# Patient Record
Sex: MALE | Race: WHITE | NOT HISPANIC OR LATINO | URBAN - METROPOLITAN AREA
[De-identification: names, ages, dates, MRNs, and addresses within clinical notes are randomized per-mention and may not be internally consistent; named-entity substitution may affect disease eponyms.]

---

## 2017-05-04 ENCOUNTER — EMERGENCY (EMERGENCY)
Facility: HOSPITAL | Age: 27
LOS: 1 days | Discharge: HOME | End: 2017-05-04
Attending: EMERGENCY MEDICINE | Admitting: EMERGENCY MEDICINE
Payer: COMMERCIAL

## 2017-05-04 VITALS
TEMPERATURE: 99 F | DIASTOLIC BLOOD PRESSURE: 75 MMHG | OXYGEN SATURATION: 98 % | SYSTOLIC BLOOD PRESSURE: 142 MMHG | HEART RATE: 86 BPM | RESPIRATION RATE: 18 BRPM

## 2017-05-04 PROCEDURE — 99283 EMERGENCY DEPT VISIT LOW MDM: CPT

## 2017-05-04 RX ORDER — FLUTICASONE PROPIONATE 50 MCG
1 SPRAY, SUSPENSION NASAL
Qty: 1 | Refills: 0 | OUTPATIENT
Start: 2017-05-04 | End: 2017-06-03

## 2017-05-04 RX ORDER — IBUPROFEN 200 MG
1 TABLET ORAL
Qty: 15 | Refills: 0 | OUTPATIENT
Start: 2017-05-04 | End: 2017-05-09

## 2017-05-04 RX ORDER — IBUPROFEN 200 MG
600 TABLET ORAL ONCE
Qty: 0 | Refills: 0 | Status: COMPLETED | OUTPATIENT
Start: 2017-05-04 | End: 2017-05-04

## 2017-05-04 RX ADMIN — Medication 600 MILLIGRAM(S): at 20:30

## 2017-05-04 NOTE — ED PROVIDER NOTE - MEDICAL DECISION MAKING DETAILS
26 y/o M with PMH of asthma p/w headache and sinus congestion for 2-3 days. sinus congestion w/ headache , nml neuro. ibuprofen f/u PCP 26 y/o M with PMH of asthma p/w headache and sinus congestion for 2-3 days. sinus congestion w/ headache , nml neuro. no nuchal rigidity.  no dc or fevers.  ibuprofen f/u PCP

## 2017-05-04 NOTE — ED PROVIDER NOTE - ATTENDING CONTRIBUTION TO CARE
Attending Attestation: Dr. Flores  I have personally performed a history and physical examination of the patient and discussed management with the resident as well as the patient.  I reviewed the resident's note and agree with the documented findings and plan of care.  I have authored and modified critical sections of the Provider Note, including but not limited to HPI, Physical Exam and MDM. 26 y/o M with PMH of asthma p/w headache and sinus congestion for 2-3 days. sinus congestion w/ headache , nml neuro. no nuchal rigidity.  no dc or fevers.  ibuprofen f/u PCP

## 2017-05-04 NOTE — ED PROVIDER NOTE - OBJECTIVE STATEMENT
28 y/o M with PMH of asthma p/w headache and sinus congestion for 2-3 days. Pt accompanied by family states that for the last 2-3 days he has had increased pressure in the front of his head w/ a headache which radiates to the back of the head. Pain is 7/10 intermittent. He denies fever, nausea, vomiting, weakness, diarrhea, dysuria, syncope, trauma 26 y/o M with PMH of asthma p/w headache and sinus congestion for 2-3 days. Pt accompanied by family states that for the last 2-3 days he has had increased pressure in the front of his head w/ a headache which radiates to the back of the head. Pain is 7/10 intermittent. He denies fever, nausea, vomiting, weakness, diarrhea, dysuria, syncope, trauma. Otherwise well. Normal PO intake.  no vision changes.

## 2017-05-04 NOTE — ED PROVIDER NOTE - CARE PLAN
Principal Discharge DX:	Sinus headache  Instructions for follow-up, activity and diet:	During your ED visit you were evaluated for headache. Take ibuprofen 600mg every 8 hours as needed for headache or excedrin migraine. Use flonase 1 spray per nostril once daily. Follow up with your PCP within 1 week. Return to the ED if you exhibit any new, continued or worsening symptoms.

## 2017-05-04 NOTE — ED PROVIDER NOTE - MUSCULOSKELETAL, MLM
Spine appears normal, range of motion is not limited, no muscle or joint tenderness Spine appears normal, range of motion is not limited, no muscle or joint tenderness. No neck pain or rigidity.

## 2017-05-04 NOTE — ED PROVIDER NOTE - PLAN OF CARE
During your ED visit you were evaluated for headache. Take ibuprofen 600mg every 8 hours as needed for headache or excedrin migraine. Use flonase 1 spray per nostril once daily. Follow up with your PCP within 1 week. Return to the ED if you exhibit any new, continued or worsening symptoms.

## 2017-05-04 NOTE — ED ADULT TRIAGE NOTE - CHIEF COMPLAINT QUOTE
c/o headache, dizziness, sinus pain x few days.  Pt endorses using Afrin and saline with some relief.

## 2018-02-17 ENCOUNTER — EMERGENCY (EMERGENCY)
Facility: HOSPITAL | Age: 28
LOS: 0 days | Discharge: HOME | End: 2018-02-17
Attending: EMERGENCY MEDICINE | Admitting: EMERGENCY MEDICINE
Payer: COMMERCIAL

## 2018-02-17 VITALS
SYSTOLIC BLOOD PRESSURE: 139 MMHG | OXYGEN SATURATION: 100 % | TEMPERATURE: 98 F | DIASTOLIC BLOOD PRESSURE: 82 MMHG | RESPIRATION RATE: 18 BRPM | HEART RATE: 58 BPM

## 2018-02-17 VITALS — WEIGHT: 160.06 LBS

## 2018-02-17 DIAGNOSIS — J34.2 DEVIATED NASAL SEPTUM: Chronic | ICD-10-CM

## 2018-02-17 DIAGNOSIS — R20.2 PARESTHESIA OF SKIN: ICD-10-CM

## 2018-02-17 DIAGNOSIS — Z88.0 ALLERGY STATUS TO PENICILLIN: ICD-10-CM

## 2018-02-17 DIAGNOSIS — J45.909 UNSPECIFIED ASTHMA, UNCOMPLICATED: ICD-10-CM

## 2018-02-17 PROCEDURE — 99285 EMERGENCY DEPT VISIT HI MDM: CPT

## 2018-02-17 NOTE — ED STATDOCS - PROGRESS NOTE DETAILS
signed Kelsey Cárdenas PA-C Pt seen and evaluated initially in intake by Dr Brannon.   Pt with tingling of left upper arm ending at elbow. Recommend outpt f/u PMD. Pt feeling well, agrees with DC and plan of care.

## 2018-02-17 NOTE — ED STATDOCS - OBJECTIVE STATEMENT
26 y/o male with a PMHx of asthma presents to the ED c/o left arm tingling beginning today. Pt felt normal yesterday and states that he might have woke up with tingling and pain. Pain starts in the back of the left shoulder and radiates to the left elbow. Never had an episode like this in the past. No weakness, neck pain. Pt had surgery 2 weeks ago for a deviated septum, and was on Tylenol, Ambien, Oxycodone. Last night, pt only took Tylenol PM. No alcohol use last night. Allergic to Amoxicillin. Family history of HTN and DM.

## 2018-02-17 NOTE — ED STATDOCS - ATTENDING CONTRIBUTION TO CARE
I, Jim Brannon MD,  performed the initial face to face bedside interview with this patient regarding history of present illness, review of symptoms and relevant past medical, social and family history.  I completed an independent physical examination.  I was the initial provider who evaluated this patient. I have signed out the follow up of any pending tests (i.e. labs, radiological studies) to the ACP.  I have communicated the patient’s plan of care and disposition with the ACP.  The history, relevant review of systems, past medical and surgical history, medical decision making, and physical examination was documented by the scribe in my presence and I attest to the accuracy of the documentation.  I, Jim Brannon MD, personally saw the patient with ACP.  I have personally performed a face to face diagnostic evaluation on this patient.  I have reviewed the ACP note and agree with the history, exam, and plan of care, except as noted.

## 2018-02-17 NOTE — ED STATDOCS - MEDICAL DECISION MAKING DETAILS
Shared decision making with pt and he will watch symptoms closely over day, if any worsening of symptoms, will return for further workup.

## 2018-03-21 ENCOUNTER — EMERGENCY (EMERGENCY)
Facility: HOSPITAL | Age: 28
LOS: 1 days | Discharge: HOME | End: 2018-03-21
Attending: EMERGENCY MEDICINE | Admitting: EMERGENCY MEDICINE
Payer: COMMERCIAL

## 2018-03-21 VITALS
TEMPERATURE: 98 F | HEART RATE: 68 BPM | OXYGEN SATURATION: 98 % | DIASTOLIC BLOOD PRESSURE: 76 MMHG | RESPIRATION RATE: 16 BRPM | SYSTOLIC BLOOD PRESSURE: 129 MMHG

## 2018-03-21 VITALS
DIASTOLIC BLOOD PRESSURE: 81 MMHG | OXYGEN SATURATION: 96 % | HEART RATE: 72 BPM | SYSTOLIC BLOOD PRESSURE: 130 MMHG | RESPIRATION RATE: 14 BRPM

## 2018-03-21 DIAGNOSIS — J34.2 DEVIATED NASAL SEPTUM: Chronic | ICD-10-CM

## 2018-03-21 LAB
ALBUMIN SERPL ELPH-MCNC: 4.8 G/DL — SIGNIFICANT CHANGE UP (ref 3.3–5)
ALP SERPL-CCNC: 51 U/L — SIGNIFICANT CHANGE UP (ref 40–120)
ALT FLD-CCNC: 69 U/L — HIGH (ref 4–41)
AST SERPL-CCNC: 39 U/L — SIGNIFICANT CHANGE UP (ref 4–40)
BASOPHILS # BLD AUTO: 0.01 K/UL — SIGNIFICANT CHANGE UP (ref 0–0.2)
BASOPHILS NFR BLD AUTO: 0.2 % — SIGNIFICANT CHANGE UP (ref 0–2)
BILIRUB SERPL-MCNC: 0.6 MG/DL — SIGNIFICANT CHANGE UP (ref 0.2–1.2)
BUN SERPL-MCNC: 15 MG/DL — SIGNIFICANT CHANGE UP (ref 7–23)
CALCIUM SERPL-MCNC: 9 MG/DL — SIGNIFICANT CHANGE UP (ref 8.4–10.5)
CHLORIDE SERPL-SCNC: 97 MMOL/L — LOW (ref 98–107)
CK MB BLD-MCNC: 0.6 — SIGNIFICANT CHANGE UP (ref 0–2.5)
CK MB BLD-MCNC: 0.6 — SIGNIFICANT CHANGE UP (ref 0–2.5)
CK MB BLD-MCNC: 1.31 NG/ML — SIGNIFICANT CHANGE UP (ref 1–6.6)
CK MB BLD-MCNC: 1.59 NG/ML — SIGNIFICANT CHANGE UP (ref 1–6.6)
CK SERPL-CCNC: 215 U/L — HIGH (ref 30–200)
CK SERPL-CCNC: 249 U/L — HIGH (ref 30–200)
CO2 SERPL-SCNC: 25 MMOL/L — SIGNIFICANT CHANGE UP (ref 22–31)
CREAT SERPL-MCNC: 1.34 MG/DL — HIGH (ref 0.5–1.3)
EOSINOPHIL # BLD AUTO: 0.03 K/UL — SIGNIFICANT CHANGE UP (ref 0–0.5)
EOSINOPHIL NFR BLD AUTO: 0.5 % — SIGNIFICANT CHANGE UP (ref 0–6)
GLUCOSE SERPL-MCNC: 91 MG/DL — SIGNIFICANT CHANGE UP (ref 70–99)
HCT VFR BLD CALC: 47.1 % — SIGNIFICANT CHANGE UP (ref 39–50)
HGB BLD-MCNC: 16.1 G/DL — SIGNIFICANT CHANGE UP (ref 13–17)
IMM GRANULOCYTES # BLD AUTO: 0.02 # — SIGNIFICANT CHANGE UP
IMM GRANULOCYTES NFR BLD AUTO: 0.3 % — SIGNIFICANT CHANGE UP (ref 0–1.5)
LYMPHOCYTES # BLD AUTO: 1.92 K/UL — SIGNIFICANT CHANGE UP (ref 1–3.3)
LYMPHOCYTES # BLD AUTO: 29.2 % — SIGNIFICANT CHANGE UP (ref 13–44)
MCHC RBC-ENTMCNC: 29 PG — SIGNIFICANT CHANGE UP (ref 27–34)
MCHC RBC-ENTMCNC: 34.2 % — SIGNIFICANT CHANGE UP (ref 32–36)
MCV RBC AUTO: 84.7 FL — SIGNIFICANT CHANGE UP (ref 80–100)
MONOCYTES # BLD AUTO: 0.45 K/UL — SIGNIFICANT CHANGE UP (ref 0–0.9)
MONOCYTES NFR BLD AUTO: 6.8 % — SIGNIFICANT CHANGE UP (ref 2–14)
NEUTROPHILS # BLD AUTO: 4.14 K/UL — SIGNIFICANT CHANGE UP (ref 1.8–7.4)
NEUTROPHILS NFR BLD AUTO: 63 % — SIGNIFICANT CHANGE UP (ref 43–77)
NRBC # FLD: 0 — SIGNIFICANT CHANGE UP
PLATELET # BLD AUTO: 243 K/UL — SIGNIFICANT CHANGE UP (ref 150–400)
PMV BLD: 10.2 FL — SIGNIFICANT CHANGE UP (ref 7–13)
POTASSIUM SERPL-MCNC: 4 MMOL/L — SIGNIFICANT CHANGE UP (ref 3.5–5.3)
POTASSIUM SERPL-SCNC: 4 MMOL/L — SIGNIFICANT CHANGE UP (ref 3.5–5.3)
PROT SERPL-MCNC: 7.8 G/DL — SIGNIFICANT CHANGE UP (ref 6–8.3)
RBC # BLD: 5.56 M/UL — SIGNIFICANT CHANGE UP (ref 4.2–5.8)
RBC # FLD: 11.8 % — SIGNIFICANT CHANGE UP (ref 10.3–14.5)
SODIUM SERPL-SCNC: 137 MMOL/L — SIGNIFICANT CHANGE UP (ref 135–145)
TROPONIN T SERPL-MCNC: < 0.06 NG/ML — SIGNIFICANT CHANGE UP (ref 0–0.06)
TROPONIN T SERPL-MCNC: < 0.06 NG/ML — SIGNIFICANT CHANGE UP (ref 0–0.06)
WBC # BLD: 6.57 K/UL — SIGNIFICANT CHANGE UP (ref 3.8–10.5)
WBC # FLD AUTO: 6.57 K/UL — SIGNIFICANT CHANGE UP (ref 3.8–10.5)

## 2018-03-21 PROCEDURE — 71046 X-RAY EXAM CHEST 2 VIEWS: CPT | Mod: 26

## 2018-03-21 PROCEDURE — 99285 EMERGENCY DEPT VISIT HI MDM: CPT

## 2018-03-21 NOTE — ED PROVIDER NOTE - ATTENDING CONTRIBUTION TO CARE
Mollyo  pt with c/o chest tightness and palpitations which occurred during an argument around 4:30 this afternoon  Also felt tightness on treadmill yesterday  Pt has asthma and tightness felt similar but today had assoc palpitations which was different for him  Alia denies recent travel  cough fever   No FH of Cad    pt also notes intermittent cold feeling to left bicep x 1 month  nonexertional  no assoc tactile temp shange or color change  exam  pt in no visible distress  clear lungs  card RRR S1S2 no RMG  no CCE  nl pulses all extremities and carotids   nl strength sensation in exts   EAST test negative  EKG  nl

## 2018-03-21 NOTE — ED ADULT TRIAGE NOTE - CHIEF COMPLAINT QUOTE
pt amb to triage c/o chest tightness with left arm pain reports numbness and tingling, pt states he was running on treadmill yesterday and experienced the same thing,  denies sob, fever, chills, pt PMH of asthma, reports taking inhaler this morning with little relief, no respiratory distress noted, ekg in progress

## 2018-03-21 NOTE — ED PROVIDER NOTE - OBJECTIVE STATEMENT
26 y/o male pmh asthma c/o chest pain x2-3 days. Pt admits to substernal chest tightness x3 days, worse after running on treadmill x20 minutes x1 day ago and after argument today. pt admits to feeling mild sob and generalized weakness associated with pain. Pt last several minutes and then resolves. Pt also c/o left arm tingling and "cold sensation" x1 month. Pt admits to being told he has cervical radiculitis in the past. Denies palpitations, diaphoresis, n/v/d, numbness, weakness, dizziness, syncope, fever or chills.

## 2018-03-21 NOTE — ED PROVIDER NOTE - CARE PLAN
Principal Discharge DX:	Chest pain  Assessment and plan of treatment:	FOLLOW UP WITH YOUR PRIMARY MEDICAL DOCTOR IN 2-3 DAYS OR WITHIN THE WEEK. If you have issues obtaining follow up, please call: [phone] to obtain a doctor or specialist who takes your insurance in your area. SHOW COPIES OF YOUR RESULTS/REPORTS GIVEN TO YOU.  Take all of your other medications as previously prescribed. Worsening or continued chest pain, shortness of breath, weakness, return to ED.

## 2020-07-14 ENCOUNTER — EMERGENCY (EMERGENCY)
Facility: HOSPITAL | Age: 30
LOS: 1 days | Discharge: HOME | End: 2020-07-14
Attending: STUDENT IN AN ORGANIZED HEALTH CARE EDUCATION/TRAINING PROGRAM | Admitting: STUDENT IN AN ORGANIZED HEALTH CARE EDUCATION/TRAINING PROGRAM
Payer: COMMERCIAL

## 2020-07-14 VITALS
TEMPERATURE: 98 F | RESPIRATION RATE: 16 BRPM | OXYGEN SATURATION: 100 % | SYSTOLIC BLOOD PRESSURE: 116 MMHG | DIASTOLIC BLOOD PRESSURE: 75 MMHG | HEART RATE: 68 BPM

## 2020-07-14 VITALS
SYSTOLIC BLOOD PRESSURE: 148 MMHG | DIASTOLIC BLOOD PRESSURE: 83 MMHG | RESPIRATION RATE: 13 BRPM | OXYGEN SATURATION: 99 % | HEART RATE: 68 BPM | TEMPERATURE: 98 F

## 2020-07-14 DIAGNOSIS — J34.2 DEVIATED NASAL SEPTUM: Chronic | ICD-10-CM

## 2020-07-14 PROBLEM — J45.909 UNSPECIFIED ASTHMA, UNCOMPLICATED: Chronic | Status: ACTIVE | Noted: 2018-02-19

## 2020-07-14 PROBLEM — J45.909 UNSPECIFIED ASTHMA, UNCOMPLICATED: Chronic | Status: ACTIVE | Noted: 2017-05-04

## 2020-07-14 PROCEDURE — 93010 ELECTROCARDIOGRAM REPORT: CPT

## 2020-07-14 PROCEDURE — 99283 EMERGENCY DEPT VISIT LOW MDM: CPT | Mod: 25

## 2020-07-14 PROCEDURE — 71046 X-RAY EXAM CHEST 2 VIEWS: CPT | Mod: 26

## 2020-07-14 RX ORDER — ACETAMINOPHEN 500 MG
975 TABLET ORAL ONCE
Refills: 0 | Status: COMPLETED | OUTPATIENT
Start: 2020-07-14 | End: 2020-07-14

## 2020-07-14 RX ORDER — IBUPROFEN 200 MG
600 TABLET ORAL ONCE
Refills: 0 | Status: COMPLETED | OUTPATIENT
Start: 2020-07-14 | End: 2020-07-14

## 2020-07-14 RX ORDER — ALBUTEROL 90 UG/1
2 AEROSOL, METERED ORAL ONCE
Refills: 0 | Status: COMPLETED | OUTPATIENT
Start: 2020-07-14 | End: 2020-07-14

## 2020-07-14 RX ADMIN — Medication 40 MILLIGRAM(S): at 05:50

## 2020-07-14 RX ADMIN — ALBUTEROL 2 PUFF(S): 90 AEROSOL, METERED ORAL at 05:51

## 2020-07-14 RX ADMIN — Medication 600 MILLIGRAM(S): at 05:36

## 2020-07-14 RX ADMIN — Medication 975 MILLIGRAM(S): at 05:37

## 2020-07-14 NOTE — ED PROVIDER NOTE - ATTENDING CONTRIBUTION TO CARE
29y/o M with PMH asthma p/w sob and  chest tightness started yesterday.Pt reports that he has 2 days of mid sternalk chest tightness which is worse with lying flat. Pain is substernal w/o radiation. When he takes deep breath symptoms are worse. Pt. reports he was recently around 2 friends that were  found to be covid positive in march . Pt denies fever, chills, n/v, abdominal pain, diarrhea. He took some motrin for the pain which improved.   denies fever, chills, cbdominal pain, diarrhea, dysuria, syncope, bleeding, new rash,weakness, numbness, blurred vision  , cough   ROS  otherwise negative as per HPI  Gen: Awake, Alert, WD, WN, NAD  Head:  NC/AT  Eyes:  PERRL, EOMI, Conjunctiva pink, lids normal, no scleral icterus  ENT: OP clear, no exudates, no erythema, uvula midline, TMs clear bilaterally, moist mucus membranes  Neck: supple, nontender, no meningismus, no JVD, trachea midline  Cardiac/CV:  S1 S2, RRR, no M/G/R  Respiratory/Pulm:  CTAB, mildly decreased BS b/l, no wheezing   Gastrointestinal/Abdomen:  Soft, nontender, nondistended, +BS, no rebound/guarding  Back:  no CVAT, no MLT  Ext:  warm, well perfused, moving all extremities spontaneously, no peripheral edema, distal pulses intact  Skin: intact, no rash  Neuro:  AAOx3, sensation intact, motor 5/5 x 4 extremities, normal gait, speech clear  MDM as above

## 2020-07-14 NOTE — ED PROVIDER NOTE - PATIENT PORTAL LINK FT
You can access the FollowMyHealth Patient Portal offered by [facility] by registering at the following website: [email or web address]. By joining [facility]’s FollowMyHealth portal, you will also be able to view your health information using other applications (apps) compatible with our system.

## 2020-07-14 NOTE — ED ADULT NURSE REASSESSMENT NOTE - NS ED NURSE REASSESS COMMENT FT1
Pt A&Ox4, ambulatory. Breathing even and unlabored. Pt not in any distress. Verbalizes no relief from medications given. States he still has chest tightness. Pt states he tested negative for COVID last month but was  he was exposed to known COVID+ people last Saturday. Will continue to monitor.

## 2020-07-14 NOTE — ED PROVIDER NOTE - CLINICAL SUMMARY MEDICAL DECISION MAKING FREE TEXT BOX
31y/o M with PMH asthma p/w sob and  chest tightness started yesterday.  Afebrile, no cough, pain mild substernal exam w/ mildly dimished BS b/l, will obtain xray , ibuprofen, tylenol, prednisone, albuterol. EKG   D/c with pcp follow up

## 2020-07-14 NOTE — ED PROVIDER NOTE - MUSCULOSKELETAL, MLM
Spine appears normal, range of motion is not limited, no muscle or joint tenderness. no chest wall TTP

## 2020-07-14 NOTE — ED ADULT NURSE NOTE - OBJECTIVE STATEMENT
Pt received in INT04. C/o chest tightness and SOB that started yesterday. Pt denies any trauma to site. States he also had numbness on both arms but it has resolved. Denies taking any medications. Denies alcohol or drug use. Hx of asthma. A&Ox4, ambulatory. Breathing even and unlabored. Normal sinus rhythm on the monitor. Vitals are stable. 2+ radial pulses bilat. No complaints of headache, nausea, dizziness, vomiting  SOB, fever, or chills. Will continue to monitor.

## 2020-07-14 NOTE — ED PROVIDER NOTE - PROGRESS NOTE DETAILS
Dr. Lilly: I have personally seen and examined this patient at the bedside. I have fully participated in the care of this patient. I have reviewed all pertinent clinical information, including history, physical exam, plan and the Resident's note and agree except as noted. HPI above as by me. PE above as by me. DDX PLAN Adele Melgar PGY3  xray neg, patient feels mildly improved with meds. will dc with steroids and pmd f/u

## 2020-07-14 NOTE — ED PROVIDER NOTE - OBJECTIVE STATEMENT
29 yo male with pmhx of asthma presenting with 2 days of chest tightness and SOB. States he has had persistent midsternal nonradiating chest tightness, nothing making better or worse. Also endorsing shortness and breath and cough, SOB worse with laying flat. Came to ED for further evaluation.     Denies fever, chills, N/V/D, LOC

## 2020-07-14 NOTE — ED PROVIDER NOTE - NSFOLLOWUPINSTRUCTIONS_ED_ALL_ED_FT
Activities as tolerated. Please encourage good oral and fluid intake. For pain, please take Motrin 400mg every 4 hours as needed, or Tylenol 650mg every 6 hours as needed.    Please see your primary care doctor within 24-48 hours for further management of your symptoms.    Please seek emergent medical management if you have any worsening signs or symptoms, such as worsening chest pain, worsening difficulty breathing, loss of consciousness, or persistent vomiting. Activities as tolerated. Please encourage good oral and fluid intake. For pain, please take Motrin 400mg every 4 hours as needed, or Tylenol 650mg every 6 hours as needed.    For difficulty breathing, please take Prednisone 50mg once a day for 4 days. Please continue to take your albuterol inhaler as needed.    Please see your primary care doctor within 24-48 hours for further management of your symptoms.    Please seek emergent medical management if you have any worsening signs or symptoms, such as worsening chest pain, worsening difficulty breathing, loss of consciousness, or persistent vomiting.

## 2022-03-25 ENCOUNTER — APPOINTMENT (OUTPATIENT)
Dept: INTERNAL MEDICINE | Facility: CLINIC | Age: 32
End: 2022-03-25
Payer: COMMERCIAL

## 2022-03-25 ENCOUNTER — NON-APPOINTMENT (OUTPATIENT)
Age: 32
End: 2022-03-25

## 2022-03-25 VITALS
HEART RATE: 76 BPM | BODY MASS INDEX: 30.46 KG/M2 | HEIGHT: 68 IN | TEMPERATURE: 98.1 F | SYSTOLIC BLOOD PRESSURE: 150 MMHG | WEIGHT: 201 LBS | OXYGEN SATURATION: 98 % | DIASTOLIC BLOOD PRESSURE: 84 MMHG

## 2022-03-25 VITALS — SYSTOLIC BLOOD PRESSURE: 132 MMHG | DIASTOLIC BLOOD PRESSURE: 88 MMHG

## 2022-03-25 DIAGNOSIS — Z82.5 FAMILY HISTORY OF ASTHMA AND OTHER CHRONIC LOWER RESPIRATORY DISEASES: ICD-10-CM

## 2022-03-25 DIAGNOSIS — G44.84 PRIMARY EXERTIONAL HEADACHE: ICD-10-CM

## 2022-03-25 DIAGNOSIS — R03.0 ELEVATED BLOOD-PRESSURE READING, W/OUT DIAGNOSIS OF HYPERTENSION: ICD-10-CM

## 2022-03-25 DIAGNOSIS — Z83.3 FAMILY HISTORY OF DIABETES MELLITUS: ICD-10-CM

## 2022-03-25 DIAGNOSIS — Z82.0 FAMILY HISTORY OF EPILEPSY AND OTHER DISEASES OF THE NERVOUS SYSTEM: ICD-10-CM

## 2022-03-25 DIAGNOSIS — Z82.49 FAMILY HISTORY OF ISCHEMIC HEART DISEASE AND OTHER DISEASES OF THE CIRCULATORY SYSTEM: ICD-10-CM

## 2022-03-25 DIAGNOSIS — Z00.00 ENCOUNTER FOR GENERAL ADULT MEDICAL EXAMINATION W/OUT ABNORMAL FINDINGS: ICD-10-CM

## 2022-03-25 PROCEDURE — 99072 ADDL SUPL MATRL&STAF TM PHE: CPT

## 2022-03-25 PROCEDURE — 99204 OFFICE O/P NEW MOD 45 MIN: CPT | Mod: 25

## 2022-03-25 PROCEDURE — 36415 COLL VENOUS BLD VENIPUNCTURE: CPT

## 2022-03-25 NOTE — ASSESSMENT
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 \par 1. discussed sleep hygeine including headspace before bed, later bedtime, more walking. fu therapist. trial of ramelteon. \par 2.ADHD rec neuro fu given adderall worsened ha \par 3. exertional ha rec imagign with ct/cta as mri was neg in past. sumatriptan for migraine, avoid triggers. \par 4. anxiety rec therapist referral for anxiety/sleep \par 5. asthma sent inhaler, stress mgmt, rec pneumovax he declined\par 6. bp send monitor discussed dash diet fu 2m \par 7. hm declines vaccines utd covid vaccines declines sti panel.

## 2022-03-25 NOTE — PHYSICAL EXAM
[No Acute Distress] : no acute distress [Well Nourished] : well nourished [Well Developed] : well developed [Normal Sclera/Conjunctiva] : normal sclera/conjunctiva [Well-Appearing] : well-appearing [PERRL] : pupils equal round and reactive to light [EOMI] : extraocular movements intact [Normal Outer Ear/Nose] : the outer ears and nose were normal in appearance [Normal Oropharynx] : the oropharynx was normal [No JVD] : no jugular venous distention [No Lymphadenopathy] : no lymphadenopathy [Supple] : supple [Thyroid Normal, No Nodules] : the thyroid was normal and there were no nodules present [No Respiratory Distress] : no respiratory distress  [No Accessory Muscle Use] : no accessory muscle use [Clear to Auscultation] : lungs were clear to auscultation bilaterally [Normal Rate] : normal rate  [Regular Rhythm] : with a regular rhythm [Normal S1, S2] : normal S1 and S2 [No Murmur] : no murmur heard [No Carotid Bruits] : no carotid bruits [No Abdominal Bruit] : a ~M bruit was not heard ~T in the abdomen [No Varicosities] : no varicosities [Pedal Pulses Present] : the pedal pulses are present [No Edema] : there was no peripheral edema [No Palpable Aorta] : no palpable aorta [No Extremity Clubbing/Cyanosis] : no extremity clubbing/cyanosis [Non Tender] : non-tender [Soft] : abdomen soft [Non-distended] : non-distended [No Masses] : no abdominal mass palpated [No HSM] : no HSM [Normal Bowel Sounds] : normal bowel sounds [Normal Supraclavicular Nodes] : no supraclavicular lymphadenopathy [Normal Posterior Cervical Nodes] : no posterior cervical lymphadenopathy [Normal Anterior Cervical Nodes] : no anterior cervical lymphadenopathy [No CVA Tenderness] : no CVA  tenderness [No Spinal Tenderness] : no spinal tenderness [No Joint Swelling] : no joint swelling [Grossly Normal Strength/Tone] : grossly normal strength/tone [No Rash] : no rash [Coordination Grossly Intact] : coordination grossly intact [No Focal Deficits] : no focal deficits [Normal Gait] : normal gait [Normal Affect] : the affect was normal [Normal Insight/Judgement] : insight and judgment were intact [de-identified] : no tmj or sinus ttp  [de-identified] : cn 2-12 intact. nl strength/sensation bl le and ue

## 2022-03-25 NOTE — HISTORY OF PRESENT ILLNESS
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 \par 1. Difficulty sleeping - trouble falling and staying asleep. working as PT. exercises 4x week. watches tv beforehand. takes benadryl before bed, melatonin. gets to bed 1030/11. wakes up 330. does headspace. does not wake up for having to urinate, pain, anxiety. out of bed 345. 2d a week falls back asleep. had sleep study. neck ok. no fever. no vision chg. no sinus congestion. no tmj. \par 2. tested positive for ADHD was helping took adderall \par 3. ha throughout the day. worse with exercise. migraines w pressure over both eyes nausea and head feels hot. also has ha deep in head which gets w exercise. both worse with poor sleep. \par 4. anxiety notes work stress no depression notes adhd sx more than anxiety and sleep gets him most anxious\par 5. asthma acts up with allergies/stress \par 6. bp notes high when weight high. fhx htn, dm \par 7. hm declines vaccines utd covid vaccines declines sti panel.

## 2022-03-28 LAB
25(OH)D3 SERPL-MCNC: 17 NG/ML
ALBUMIN SERPL ELPH-MCNC: 5.3 G/DL
ALP BLD-CCNC: 51 U/L
ALT SERPL-CCNC: 29 U/L
ANION GAP SERPL CALC-SCNC: 16 MMOL/L
AST SERPL-CCNC: 29 U/L
BASOPHILS # BLD AUTO: 0.03 K/UL
BASOPHILS NFR BLD AUTO: 0.4 %
BILIRUB SERPL-MCNC: 0.4 MG/DL
BUN SERPL-MCNC: 13 MG/DL
CALCIUM SERPL-MCNC: 9.7 MG/DL
CHLORIDE SERPL-SCNC: 100 MMOL/L
CHOLEST SERPL-MCNC: 212 MG/DL
CO2 SERPL-SCNC: 23 MMOL/L
CREAT SERPL-MCNC: 1.16 MG/DL
EGFR: 87 ML/MIN/1.73M2
EOSINOPHIL # BLD AUTO: 0.1 K/UL
EOSINOPHIL NFR BLD AUTO: 1.3 %
ESTIMATED AVERAGE GLUCOSE: 111 MG/DL
GLUCOSE SERPL-MCNC: 66 MG/DL
HBA1C MFR BLD HPLC: 5.5 %
HCT VFR BLD CALC: 49.2 %
HDLC SERPL-MCNC: 44 MG/DL
HGB BLD-MCNC: 16.8 G/DL
IMM GRANULOCYTES NFR BLD AUTO: 0.3 %
LDLC SERPL CALC-MCNC: 138 MG/DL
LYMPHOCYTES # BLD AUTO: 2.73 K/UL
LYMPHOCYTES NFR BLD AUTO: 35.6 %
MAN DIFF?: NORMAL
MCHC RBC-ENTMCNC: 29.3 PG
MCHC RBC-ENTMCNC: 34.1 GM/DL
MCV RBC AUTO: 85.7 FL
MONOCYTES # BLD AUTO: 0.57 K/UL
MONOCYTES NFR BLD AUTO: 7.4 %
NEUTROPHILS # BLD AUTO: 4.21 K/UL
NEUTROPHILS NFR BLD AUTO: 55 %
NONHDLC SERPL-MCNC: 168 MG/DL
PLATELET # BLD AUTO: 286 K/UL
POTASSIUM SERPL-SCNC: 4.8 MMOL/L
PROT SERPL-MCNC: 7.7 G/DL
RBC # BLD: 5.74 M/UL
RBC # FLD: 11.9 %
SODIUM SERPL-SCNC: 139 MMOL/L
TRIGL SERPL-MCNC: 152 MG/DL
TSH SERPL-ACNC: 3.29 UIU/ML
VIT B12 SERPL-MCNC: 451 PG/ML
WBC # FLD AUTO: 7.66 K/UL

## 2022-03-30 ENCOUNTER — TRANSCRIPTION ENCOUNTER (OUTPATIENT)
Age: 32
End: 2022-03-30

## 2022-03-31 ENCOUNTER — TRANSCRIPTION ENCOUNTER (OUTPATIENT)
Age: 32
End: 2022-03-31

## 2022-04-01 ENCOUNTER — TRANSCRIPTION ENCOUNTER (OUTPATIENT)
Age: 32
End: 2022-04-01

## 2022-04-07 ENCOUNTER — APPOINTMENT (OUTPATIENT)
Dept: CT IMAGING | Facility: IMAGING CENTER | Age: 32
End: 2022-04-07
Payer: COMMERCIAL

## 2022-04-07 ENCOUNTER — OUTPATIENT (OUTPATIENT)
Dept: OUTPATIENT SERVICES | Facility: HOSPITAL | Age: 32
LOS: 1 days | End: 2022-04-07
Payer: COMMERCIAL

## 2022-04-07 DIAGNOSIS — J34.2 DEVIATED NASAL SEPTUM: Chronic | ICD-10-CM

## 2022-04-07 DIAGNOSIS — G44.84 PRIMARY EXERTIONAL HEADACHE: ICD-10-CM

## 2022-04-07 PROCEDURE — 70496 CT ANGIOGRAPHY HEAD: CPT | Mod: 26

## 2022-04-07 PROCEDURE — 70496 CT ANGIOGRAPHY HEAD: CPT

## 2022-06-27 ENCOUNTER — APPOINTMENT (OUTPATIENT)
Dept: INTERNAL MEDICINE | Facility: CLINIC | Age: 32
End: 2022-06-27

## 2023-06-28 ENCOUNTER — APPOINTMENT (OUTPATIENT)
Dept: INTERNAL MEDICINE | Facility: CLINIC | Age: 33
End: 2023-06-28
Payer: COMMERCIAL

## 2023-06-28 VITALS
SYSTOLIC BLOOD PRESSURE: 133 MMHG | WEIGHT: 206 LBS | HEIGHT: 68 IN | OXYGEN SATURATION: 98 % | DIASTOLIC BLOOD PRESSURE: 84 MMHG | BODY MASS INDEX: 31.22 KG/M2 | HEART RATE: 85 BPM | TEMPERATURE: 97.9 F

## 2023-06-28 VITALS — SYSTOLIC BLOOD PRESSURE: 124 MMHG | DIASTOLIC BLOOD PRESSURE: 70 MMHG

## 2023-06-28 DIAGNOSIS — E55.9 VITAMIN D DEFICIENCY, UNSPECIFIED: ICD-10-CM

## 2023-06-28 DIAGNOSIS — F32.A DEPRESSION, UNSPECIFIED: ICD-10-CM

## 2023-06-28 PROCEDURE — 99214 OFFICE O/P EST MOD 30 MIN: CPT

## 2023-06-28 RX ORDER — ALBUTEROL SULFATE 90 UG/1
108 (90 BASE) INHALANT RESPIRATORY (INHALATION)
Qty: 1 | Refills: 3 | Status: ACTIVE | COMMUNITY
Start: 2023-06-28 | End: 1900-01-01

## 2023-06-28 RX ORDER — RAMELTEON 8 MG/1
8 TABLET ORAL
Qty: 30 | Refills: 2 | Status: DISCONTINUED | COMMUNITY
Start: 2022-03-25 | End: 2023-06-28

## 2023-06-28 RX ORDER — SUMATRIPTAN 25 MG/1
25 TABLET, FILM COATED ORAL
Qty: 1 | Refills: 3 | Status: DISCONTINUED | COMMUNITY
Start: 2022-03-25 | End: 2023-06-28

## 2023-06-28 NOTE — HEALTH RISK ASSESSMENT
[2 - 4 times a month (2 pts)] : 2-4 times a month (2 points) [1 or 2 (0 pts)] : 1 or 2 (0 points) [Never (0 pts)] : Never (0 points) [Yes] : In the past 12 months have you used drugs other than those required for medical reasons? Yes [Never] : Never [de-identified] : occasional marijuana [de-identified] : regular

## 2023-06-28 NOTE — ASSESSMENT
[FreeTextEntry1] : Mood - anxiety/depression/ADHD/insomnia:\par Better managed now with current meds \par Consider switching back to Remeron for sleep in the future and stop benzos\par Meds currently managed/prescribed by psychiatry \par Hopes to restart therapy, which has been helpful in the past \par \par Migraines:\par Currently triggered by use of Adderall/Vyvanse\par CTA last year unremarkable, reports normal MRI in the past as well\par Neuro f/u suggested to assist with management of ADHD + migraines\par Will try Nurtec, no change in ADHD meds today\par \par Nausea, heartburn:\par Working on dietary changes, meds as needed \par \par Asthma:\par Asymptomatic\par \par Obesity:\par Partially r/t excess muscle mass\par Improved eating habits with mood management\par Exercising regularly\par Will monitor \par

## 2023-06-28 NOTE — REVIEW OF SYSTEMS
[Nausea] : nausea [Heartburn] : heartburn [Headache] : headache [Insomnia] : insomnia [Anxiety] : anxiety [Depression] : depression [Negative] : Musculoskeletal [Dizziness] : no dizziness [Confusion] : no confusion [Unsteady Walk] : no ataxia [FreeTextEntry7] : see HPI  [de-identified] : see HPI  [de-identified] : see HPI

## 2023-06-28 NOTE — PHYSICAL EXAM
[No Acute Distress] : no acute distress [No Carotid Bruits] : no carotid bruits [Pedal Pulses Present] : the pedal pulses are present [No Edema] : there was no peripheral edema [Normal Supraclavicular Nodes] : no supraclavicular lymphadenopathy [Coordination Grossly Intact] : coordination grossly intact [No Focal Deficits] : no focal deficits [Normal Gait] : normal gait [Normal] : affect was normal and insight and judgment were intact

## 2023-06-28 NOTE — HISTORY OF PRESENT ILLNESS
[FreeTextEntry8] : 33 y.o. male, PMHx sleep disturbance, ADHD, headaches, anxiety, depression, asthma, elevated blood pressure, obesity.  \par \par Meds recently for ADHD and anxiety: \par Adderall 10mg once daily, sometimes additional 5 mg was initial prescribed. \par More recently switched Vyvance 40mg once daily d/t Adderall shortage, still may take additional 5mg adderall later in the day.  \par Buspirone 10mg. \par Klonipin 1 or 1.5mg for sleep.\par Much more able to get through daily tasks and is less forgetful with meds. \par Depression is better managed when ADHD is controlled.  Meds in the past - Wellbutrin, Lexapro, fluoxetine, trazodone, hydroxyzine, Remeron.  Thinks the Remeron helped him sleep the most.  \par \par Therapy in the past, but not currently d/t insurance change.  \par \par Headaches/migraines after Adderall, pretty much every afternoon, which he is not sure how to manage.  Has only been treating his ADHD regularly for a few weeks because of the related migraines.  OTC meds do not help for his migraines.  Has not taken the triptan as told was contraindicated with Adderall, but sumatriptan has helped with headaches in the past.   Additional symptoms with migraines include nausea, light and noise sensitivity inability to focus/concentrate.  \par \par Currently working as a physical therapist 5 days/week.  May or may not take Vyvance/Adderall on the weekend depending how productive he was during the week and what needs to be done. Days without the meds may get a mild tension headaches, but no migraines.   \par \par Neurology in the past, but just given Remeron for the sleep, did not get to manage the headaches.  \par \par Diet is poor when not treating ADHD.  Makes better choices and eats less when treated.  \par \par Episodic nausea, heartburn.  Reports scope done last year, mild irritation.  Takes PRN omeprazole or zofran.  \par

## 2023-06-30 LAB
25(OH)D3 SERPL-MCNC: 20.8 NG/ML
ALBUMIN SERPL ELPH-MCNC: 4.8 G/DL
ALP BLD-CCNC: 46 U/L
ALT SERPL-CCNC: 44 U/L
ANION GAP SERPL CALC-SCNC: 13 MMOL/L
AST SERPL-CCNC: 36 U/L
BILIRUB SERPL-MCNC: 0.4 MG/DL
BUN SERPL-MCNC: 14 MG/DL
C TRACH RRNA SPEC QL NAA+PROBE: NOT DETECTED
CALCIUM SERPL-MCNC: 10.1 MG/DL
CHLORIDE SERPL-SCNC: 105 MMOL/L
CHOLEST SERPL-MCNC: 225 MG/DL
CO2 SERPL-SCNC: 26 MMOL/L
CREAT SERPL-MCNC: 1.15 MG/DL
EGFR: 87 ML/MIN/1.73M2
ESTIMATED AVERAGE GLUCOSE: 117 MG/DL
FOLATE SERPL-MCNC: 8.4 NG/ML
GLUCOSE SERPL-MCNC: 105 MG/DL
HBA1C MFR BLD HPLC: 5.7 %
HCT VFR BLD CALC: 48.9 %
HCV AB SER QL: NONREACTIVE
HCV S/CO RATIO: 0.14 S/CO
HDLC SERPL-MCNC: 50 MG/DL
HGB BLD-MCNC: 16.5 G/DL
HIV1+2 AB SPEC QL IA.RAPID: NONREACTIVE
LDLC SERPL CALC-MCNC: 145 MG/DL
MAGNESIUM SERPL-MCNC: 2.2 MG/DL
MCHC RBC-ENTMCNC: 30.3 PG
MCHC RBC-ENTMCNC: 33.7 GM/DL
MCV RBC AUTO: 89.9 FL
N GONORRHOEA RRNA SPEC QL NAA+PROBE: NOT DETECTED
NONHDLC SERPL-MCNC: 174 MG/DL
PLATELET # BLD AUTO: 246 K/UL
POTASSIUM SERPL-SCNC: 4.6 MMOL/L
PROT SERPL-MCNC: 7.5 G/DL
RBC # BLD: 5.44 M/UL
RBC # FLD: 12.2 %
SODIUM SERPL-SCNC: 143 MMOL/L
SOURCE AMPLIFICATION: NORMAL
T PALLIDUM AB SER QL IA: NEGATIVE
TRIGL SERPL-MCNC: 144 MG/DL
TSH SERPL-ACNC: 3.45 UIU/ML
VIT B12 SERPL-MCNC: 606 PG/ML
WBC # FLD AUTO: 6.21 K/UL

## 2023-07-19 ENCOUNTER — NON-APPOINTMENT (OUTPATIENT)
Age: 33
End: 2023-07-19

## 2024-01-17 ENCOUNTER — APPOINTMENT (OUTPATIENT)
Dept: INTERNAL MEDICINE | Facility: CLINIC | Age: 34
End: 2024-01-17
Payer: COMMERCIAL

## 2024-01-17 VITALS
HEART RATE: 83 BPM | WEIGHT: 197 LBS | RESPIRATION RATE: 15 BRPM | OXYGEN SATURATION: 98 % | DIASTOLIC BLOOD PRESSURE: 85 MMHG | SYSTOLIC BLOOD PRESSURE: 146 MMHG | BODY MASS INDEX: 29.95 KG/M2

## 2024-01-17 DIAGNOSIS — F41.9 ANXIETY DISORDER, UNSPECIFIED: ICD-10-CM

## 2024-01-17 DIAGNOSIS — H61.20 IMPACTED CERUMEN, UNSPECIFIED EAR: ICD-10-CM

## 2024-01-17 DIAGNOSIS — F90.9 ATTENTION-DEFICIT HYPERACTIVITY DISORDER, UNSPECIFIED TYPE: ICD-10-CM

## 2024-01-17 DIAGNOSIS — G43.E11 CHRONIC MIGRAINE WITH AURA, INTRACTABLE, WITH STATUS MIGRAINOSUS: ICD-10-CM

## 2024-01-17 PROCEDURE — 99214 OFFICE O/P EST MOD 30 MIN: CPT

## 2024-01-17 RX ORDER — DEXTROAMPHETAMINE SACCHARATE, AMPHETAMINE ASPARTATE, DEXTROAMPHETAMINE SULFATE, AND AMPHETAMINE SULFATE 1.25; 1.25; 1.25; 1.25 MG/1; MG/1; MG/1; MG/1
5 TABLET ORAL DAILY
Refills: 0 | Status: DISCONTINUED | COMMUNITY
Start: 2023-06-28 | End: 2024-01-17

## 2024-01-17 RX ORDER — RIZATRIPTAN BENZOATE 10 MG/1
10 TABLET ORAL
Qty: 30 | Refills: 3 | Status: ACTIVE | COMMUNITY
Start: 2024-01-17 | End: 1900-01-01

## 2024-01-17 RX ORDER — RIMEGEPANT SULFATE 75 MG/75MG
75 TABLET, ORALLY DISINTEGRATING ORAL
Qty: 30 | Refills: 1 | Status: DISCONTINUED | COMMUNITY
Start: 2023-06-28 | End: 2024-01-17

## 2024-01-17 RX ORDER — CLONAZEPAM 1 MG/1
1 TABLET ORAL
Qty: 30 | Refills: 0 | Status: DISCONTINUED | COMMUNITY
Start: 2023-06-28 | End: 2024-01-17

## 2024-01-17 RX ORDER — BUSPIRONE HYDROCHLORIDE 10 MG/1
10 TABLET ORAL
Refills: 0 | Status: DISCONTINUED | COMMUNITY
Start: 2023-06-28 | End: 2024-01-17

## 2024-01-17 RX ORDER — LISDEXAMFETAMINE DIMESYLATE 40 MG/1
40 CAPSULE ORAL
Refills: 0 | Status: DISCONTINUED | COMMUNITY
Start: 2023-06-28 | End: 2024-01-17

## 2024-01-17 RX ORDER — BUPROPION HYDROCHLORIDE 100 MG/1
100 TABLET, FILM COATED, EXTENDED RELEASE ORAL
Refills: 0 | Status: ACTIVE | COMMUNITY
Start: 2024-01-17

## 2024-01-18 DIAGNOSIS — J45.909 UNSPECIFIED ASTHMA, UNCOMPLICATED: ICD-10-CM

## 2024-01-18 RX ORDER — FLUTICASONE PROPIONATE AND SALMETEROL XINAFOATE 115; 21 UG/1; UG/1
115-21 AEROSOL, METERED RESPIRATORY (INHALATION) TWICE DAILY
Qty: 1 | Refills: 3 | Status: DISCONTINUED | COMMUNITY
Start: 2022-03-25 | End: 2024-01-18

## 2024-01-18 RX ORDER — FLUTICASONE FUROATE AND VILANTEROL TRIFENATATE 100; 25 UG/1; UG/1
100-25 POWDER RESPIRATORY (INHALATION)
Qty: 1 | Refills: 4 | Status: ACTIVE | COMMUNITY
Start: 2024-01-18 | End: 1900-01-01

## 2024-01-18 RX ORDER — BUDESONIDE AND FORMOTEROL FUMARATE DIHYDRATE 160; 4.5 UG/1; UG/1
160-4.5 AEROSOL RESPIRATORY (INHALATION)
Qty: 1 | Refills: 2 | Status: DISCONTINUED | COMMUNITY
Start: 2024-01-17 | End: 2024-01-18

## 2024-01-18 RX ORDER — BLOOD-GLUCOSE METER
KIT MISCELLANEOUS
Qty: 1 | Refills: 0 | Status: ACTIVE | COMMUNITY
Start: 2022-03-25 | End: 1900-01-01

## 2024-01-18 NOTE — HISTORY OF PRESENT ILLNESS
[de-identified] : 33 y.o. male, PMHx sleep disturbance, ADHD, headaches, anxiety, depression, asthma, elevated blood pressure, obesity. Meds changes recently.   Bupropion 100mg for depression, anxiety and "off label" treatment for ADHD.   Topamax for headaches.   Ramelteon for sleep, long h/o insomnia.   Saw neurology a few months ago for headaches.  Triptans in the past for headaches.  This is his first preventative medication (Topamax), which helps somewhat.  Seemed to work better initially, but less now.   Ramelteon started just a week ago, maybe helping with sleep.   Switched from Adderall to Vyvanse.  Both caused headaches at the end of the day along with irritability.  Definitely felt better when treating ADHD, less fatigue and better focus, but could not tolerate the headaches.  Stopped Vyvanse about 1 week ago when started bupropion.  Follows with psychiatry.     CT angio done d/t worsening headache with exercise - normal.  Still happens when he exercises.   Continues to work as PT.   Lots of stress recently - mom and girlfriend's mom with recent hospitalizations, critical illnesses.   Continues f/u with therapist.   Also with concern with fatigue, low libido, brain fog, weight.   Feels like can't lose weight no matter how well he eats or how much he exercises.  Fatigue and brain fog better when ADHD well treated.   Also with recent dizzy spells, only when he turns his head quickly.

## 2024-01-18 NOTE — ASSESSMENT
[FreeTextEntry1] : --- Fatigue, low libido, inability to lose weight: check am labs endo eval  ADHD, depression, anxiety: ADHD well treated with stimulants, but they caused headaches.  Symptoms worse since stopping, recently started trial of bupropion.  Continues close f/u with psychiatry and neuro  Headaches: Started treatment through neuro.  Taking Topamax with some improvement.   Renewal provided for triptan/naproxen as needed.   Insomnia: Recently started ramelteon  Asthma: Acts up at times Felt better with inhaler, ran out Rx sent in for symbicort, advised can be used more successfully as PRN than Advair.  will go back to Advair if not covered   Elevated BP: Consider trial of CCB which may help the headaches as well.  Will check home readings prior to starting meds and send message with numbers

## 2024-02-01 ENCOUNTER — NON-APPOINTMENT (OUTPATIENT)
Age: 34
End: 2024-02-01

## 2024-02-01 DIAGNOSIS — F51.04 PSYCHOPHYSIOLOGIC INSOMNIA: ICD-10-CM

## 2024-02-01 DIAGNOSIS — R74.8 ABNORMAL LEVELS OF OTHER SERUM ENZYMES: ICD-10-CM

## 2024-02-01 LAB
ALBUMIN SERPL ELPH-MCNC: 4.7 G/DL
ALDOSTERONE SERUM: 8.7 NG/DL
ALP BLD-CCNC: 50 U/L
ALT SERPL-CCNC: 127 U/L
ANION GAP SERPL CALC-SCNC: 14 MMOL/L
APPEARANCE: CLEAR
AST SERPL-CCNC: 39 U/L
BASOPHILS # BLD AUTO: 0.03 K/UL
BASOPHILS NFR BLD AUTO: 0.4 %
BILIRUB SERPL-MCNC: 0.5 MG/DL
BILIRUBIN URINE: NEGATIVE
BLOOD URINE: NEGATIVE
BUN SERPL-MCNC: 19 MG/DL
CALCIUM SERPL-MCNC: 9.4 MG/DL
CHLORIDE SERPL-SCNC: 106 MMOL/L
CO2 SERPL-SCNC: 22 MMOL/L
COLOR: YELLOW
CORTIS SERPL-MCNC: 8.8 UG/DL
CREAT SERPL-MCNC: 1.29 MG/DL
CREAT SPEC-SCNC: 243 MG/DL
EGFR: 76 ML/MIN/1.73M2
EOSINOPHIL # BLD AUTO: 0.15 K/UL
EOSINOPHIL NFR BLD AUTO: 2 %
GLUCOSE QUALITATIVE U: NEGATIVE MG/DL
GLUCOSE SERPL-MCNC: 86 MG/DL
HCT VFR BLD CALC: 47.1 %
HGB BLD-MCNC: 16.3 G/DL
IMM GRANULOCYTES NFR BLD AUTO: 0.1 %
KETONES URINE: NEGATIVE MG/DL
LEUKOCYTE ESTERASE URINE: NEGATIVE
LYMPHOCYTES # BLD AUTO: 2.81 K/UL
LYMPHOCYTES NFR BLD AUTO: 37.2 %
MAN DIFF?: NORMAL
MCHC RBC-ENTMCNC: 30.6 PG
MCHC RBC-ENTMCNC: 34.6 GM/DL
MCV RBC AUTO: 88.4 FL
MICROALBUMIN 24H UR DL<=1MG/L-MCNC: <1.2 MG/DL
MICROALBUMIN/CREAT 24H UR-RTO: NORMAL MG/G
MONOCYTES # BLD AUTO: 0.56 K/UL
MONOCYTES NFR BLD AUTO: 7.4 %
NEUTROPHILS # BLD AUTO: 4 K/UL
NEUTROPHILS NFR BLD AUTO: 52.9 %
NITRITE URINE: NEGATIVE
PH URINE: 5.5
PLATELET # BLD AUTO: 261 K/UL
POTASSIUM SERPL-SCNC: 4.5 MMOL/L
PROT SERPL-MCNC: 7.2 G/DL
PROTEIN URINE: NEGATIVE MG/DL
RBC # BLD: 5.33 M/UL
RBC # FLD: 12.7 %
SODIUM SERPL-SCNC: 142 MMOL/L
SPECIFIC GRAVITY URINE: 1.03
T4 FREE SERPL-MCNC: 1.5 NG/DL
TESTOST FREE SERPL-MCNC: 5.7 PG/ML
TESTOST SERPL-MCNC: 396 NG/DL
THYROGLOB AB SERPL-ACNC: <20 IU/ML
THYROPEROXIDASE AB SERPL IA-ACNC: <10 IU/ML
TSH SERPL-ACNC: 4.58 UIU/ML
UROBILINOGEN URINE: 0.2 MG/DL
WBC # FLD AUTO: 7.56 K/UL

## 2024-02-01 RX ORDER — TOPIRAMATE 25 MG/1
25 TABLET, COATED ORAL TWICE DAILY
Refills: 0 | Status: DISCONTINUED | COMMUNITY
Start: 2024-01-17 | End: 2024-02-01

## 2024-02-01 RX ORDER — AMLODIPINE BESYLATE 2.5 MG/1
2.5 TABLET ORAL
Qty: 90 | Refills: 1 | Status: ACTIVE | COMMUNITY
Start: 2024-02-01 | End: 1900-01-01

## 2024-02-01 RX ORDER — RAMELTEON 8 MG/1
8 TABLET ORAL
Refills: 0 | Status: DISCONTINUED | COMMUNITY
Start: 2024-01-17 | End: 2024-02-01

## 2024-02-01 RX ORDER — RIMEGEPANT SULFATE 75 MG/75MG
75 TABLET, ORALLY DISINTEGRATING ORAL
Refills: 0 | Status: ACTIVE | COMMUNITY
Start: 2024-02-01

## 2024-02-01 RX ORDER — AMITRIPTYLINE HYDROCHLORIDE 10 MG/1
10 TABLET, FILM COATED ORAL AT BEDTIME
Refills: 0 | Status: ACTIVE | COMMUNITY
Start: 2024-02-01

## 2024-02-03 ENCOUNTER — EMERGENCY (EMERGENCY)
Facility: HOSPITAL | Age: 34
LOS: 1 days | Discharge: HOME | End: 2024-02-03
Attending: EMERGENCY MEDICINE
Payer: COMMERCIAL

## 2024-02-03 ENCOUNTER — TRANSCRIPTION ENCOUNTER (OUTPATIENT)
Age: 34
End: 2024-02-03

## 2024-02-03 VITALS
RESPIRATION RATE: 18 BRPM | OXYGEN SATURATION: 98 % | SYSTOLIC BLOOD PRESSURE: 130 MMHG | TEMPERATURE: 98 F | HEART RATE: 85 BPM | DIASTOLIC BLOOD PRESSURE: 87 MMHG

## 2024-02-03 VITALS
HEIGHT: 68 IN | OXYGEN SATURATION: 97 % | WEIGHT: 195.11 LBS | HEART RATE: 89 BPM | RESPIRATION RATE: 18 BRPM | DIASTOLIC BLOOD PRESSURE: 91 MMHG | SYSTOLIC BLOOD PRESSURE: 140 MMHG | TEMPERATURE: 98 F

## 2024-02-03 DIAGNOSIS — J34.2 DEVIATED NASAL SEPTUM: Chronic | ICD-10-CM

## 2024-02-03 LAB
ALBUMIN SERPL ELPH-MCNC: 4.6 G/DL — SIGNIFICANT CHANGE UP (ref 3.3–5)
ALP SERPL-CCNC: 50 U/L — SIGNIFICANT CHANGE UP (ref 40–120)
ALT FLD-CCNC: 85 U/L — HIGH (ref 10–45)
ANION GAP SERPL CALC-SCNC: 11 MMOL/L — SIGNIFICANT CHANGE UP (ref 5–17)
APPEARANCE UR: CLEAR — SIGNIFICANT CHANGE UP
APTT BLD: 32.7 SEC — SIGNIFICANT CHANGE UP (ref 24.5–35.6)
AST SERPL-CCNC: 48 U/L — HIGH (ref 10–40)
BACTERIA # UR AUTO: NEGATIVE /HPF — SIGNIFICANT CHANGE UP
BASOPHILS # BLD AUTO: 0.02 K/UL — SIGNIFICANT CHANGE UP (ref 0–0.2)
BASOPHILS NFR BLD AUTO: 0.4 % — SIGNIFICANT CHANGE UP (ref 0–2)
BILIRUB SERPL-MCNC: 0.9 MG/DL — SIGNIFICANT CHANGE UP (ref 0.2–1.2)
BILIRUB UR-MCNC: NEGATIVE — SIGNIFICANT CHANGE UP
BUN SERPL-MCNC: 14 MG/DL — SIGNIFICANT CHANGE UP (ref 7–23)
CALCIUM SERPL-MCNC: 9.9 MG/DL — SIGNIFICANT CHANGE UP (ref 8.4–10.5)
CAST: 0 /LPF — SIGNIFICANT CHANGE UP (ref 0–4)
CHLORIDE SERPL-SCNC: 102 MMOL/L — SIGNIFICANT CHANGE UP (ref 96–108)
CO2 SERPL-SCNC: 24 MMOL/L — SIGNIFICANT CHANGE UP (ref 22–31)
COLOR SPEC: YELLOW — SIGNIFICANT CHANGE UP
CREAT SERPL-MCNC: 1.14 MG/DL — SIGNIFICANT CHANGE UP (ref 0.5–1.3)
DIFF PNL FLD: NEGATIVE — SIGNIFICANT CHANGE UP
EGFR: 88 ML/MIN/1.73M2 — SIGNIFICANT CHANGE UP
EOSINOPHIL # BLD AUTO: 0.07 K/UL — SIGNIFICANT CHANGE UP (ref 0–0.5)
EOSINOPHIL NFR BLD AUTO: 1.5 % — SIGNIFICANT CHANGE UP (ref 0–6)
GLUCOSE SERPL-MCNC: 92 MG/DL — SIGNIFICANT CHANGE UP (ref 70–99)
GLUCOSE UR QL: NEGATIVE MG/DL — SIGNIFICANT CHANGE UP
HCT VFR BLD CALC: 49.1 % — SIGNIFICANT CHANGE UP (ref 39–50)
HGB BLD-MCNC: 16.7 G/DL — SIGNIFICANT CHANGE UP (ref 13–17)
IMM GRANULOCYTES NFR BLD AUTO: 0.2 % — SIGNIFICANT CHANGE UP (ref 0–0.9)
INR BLD: 1.12 RATIO — SIGNIFICANT CHANGE UP (ref 0.85–1.18)
KETONES UR-MCNC: NEGATIVE MG/DL — SIGNIFICANT CHANGE UP
LEUKOCYTE ESTERASE UR-ACNC: NEGATIVE — SIGNIFICANT CHANGE UP
LYMPHOCYTES # BLD AUTO: 1.93 K/UL — SIGNIFICANT CHANGE UP (ref 1–3.3)
LYMPHOCYTES # BLD AUTO: 42.4 % — SIGNIFICANT CHANGE UP (ref 13–44)
MCHC RBC-ENTMCNC: 29.8 PG — SIGNIFICANT CHANGE UP (ref 27–34)
MCHC RBC-ENTMCNC: 34 GM/DL — SIGNIFICANT CHANGE UP (ref 32–36)
MCV RBC AUTO: 87.7 FL — SIGNIFICANT CHANGE UP (ref 80–100)
MONOCYTES # BLD AUTO: 0.37 K/UL — SIGNIFICANT CHANGE UP (ref 0–0.9)
MONOCYTES NFR BLD AUTO: 8.1 % — SIGNIFICANT CHANGE UP (ref 2–14)
NEUTROPHILS # BLD AUTO: 2.15 K/UL — SIGNIFICANT CHANGE UP (ref 1.8–7.4)
NEUTROPHILS NFR BLD AUTO: 47.4 % — SIGNIFICANT CHANGE UP (ref 43–77)
NITRITE UR-MCNC: NEGATIVE — SIGNIFICANT CHANGE UP
NRBC # BLD: 0 /100 WBCS — SIGNIFICANT CHANGE UP (ref 0–0)
PH UR: 5.5 — SIGNIFICANT CHANGE UP (ref 5–8)
PLATELET # BLD AUTO: 249 K/UL — SIGNIFICANT CHANGE UP (ref 150–400)
POTASSIUM SERPL-MCNC: 4.3 MMOL/L — SIGNIFICANT CHANGE UP (ref 3.5–5.3)
POTASSIUM SERPL-SCNC: 4.3 MMOL/L — SIGNIFICANT CHANGE UP (ref 3.5–5.3)
PROT SERPL-MCNC: 7.6 G/DL — SIGNIFICANT CHANGE UP (ref 6–8.3)
PROT UR-MCNC: NEGATIVE MG/DL — SIGNIFICANT CHANGE UP
PROTHROM AB SERPL-ACNC: 12.3 SEC — SIGNIFICANT CHANGE UP (ref 9.5–13)
RBC # BLD: 5.6 M/UL — SIGNIFICANT CHANGE UP (ref 4.2–5.8)
RBC # FLD: 11.9 % — SIGNIFICANT CHANGE UP (ref 10.3–14.5)
RBC CASTS # UR COMP ASSIST: 0 /HPF — SIGNIFICANT CHANGE UP (ref 0–4)
SODIUM SERPL-SCNC: 137 MMOL/L — SIGNIFICANT CHANGE UP (ref 135–145)
SP GR SPEC: 1.03 — SIGNIFICANT CHANGE UP (ref 1–1.03)
SQUAMOUS # UR AUTO: 0 /HPF — SIGNIFICANT CHANGE UP (ref 0–5)
UROBILINOGEN FLD QL: 0.2 MG/DL — SIGNIFICANT CHANGE UP (ref 0.2–1)
WBC # BLD: 4.55 K/UL — SIGNIFICANT CHANGE UP (ref 3.8–10.5)
WBC # FLD AUTO: 4.55 K/UL — SIGNIFICANT CHANGE UP (ref 3.8–10.5)
WBC UR QL: 0 /HPF — SIGNIFICANT CHANGE UP (ref 0–5)

## 2024-02-03 PROCEDURE — 85025 COMPLETE CBC W/AUTO DIFF WBC: CPT

## 2024-02-03 PROCEDURE — 36415 COLL VENOUS BLD VENIPUNCTURE: CPT

## 2024-02-03 PROCEDURE — 71046 X-RAY EXAM CHEST 2 VIEWS: CPT

## 2024-02-03 PROCEDURE — 76705 ECHO EXAM OF ABDOMEN: CPT

## 2024-02-03 PROCEDURE — 71046 X-RAY EXAM CHEST 2 VIEWS: CPT | Mod: 26

## 2024-02-03 PROCEDURE — 81001 URINALYSIS AUTO W/SCOPE: CPT

## 2024-02-03 PROCEDURE — 85730 THROMBOPLASTIN TIME PARTIAL: CPT

## 2024-02-03 PROCEDURE — 87086 URINE CULTURE/COLONY COUNT: CPT

## 2024-02-03 PROCEDURE — 85610 PROTHROMBIN TIME: CPT

## 2024-02-03 PROCEDURE — 80074 ACUTE HEPATITIS PANEL: CPT

## 2024-02-03 PROCEDURE — 76705 ECHO EXAM OF ABDOMEN: CPT | Mod: 26

## 2024-02-03 PROCEDURE — 99285 EMERGENCY DEPT VISIT HI MDM: CPT

## 2024-02-03 PROCEDURE — 99284 EMERGENCY DEPT VISIT MOD MDM: CPT | Mod: 25

## 2024-02-03 PROCEDURE — 80053 COMPREHEN METABOLIC PANEL: CPT

## 2024-02-03 PROCEDURE — 83690 ASSAY OF LIPASE: CPT

## 2024-02-03 NOTE — ED PROVIDER NOTE - OBJECTIVE STATEMENT
33-year-old male  past medical history anxiety and depression presenting to the emergency department for 1 day of right flank and right upper quadrant pain radiating to the right groin area (patient decided to come to the emergency department today as he was told his ALT was elevated at the doctor's few weeks ago (147) and is concerned that his pain is related to this), no associated nausea, vomiting, fever, chest pain, SOB.   Denies dysuria or hematuria.  No rash in the area.  No prior surgical history.  Denies alcohol use, recent travel.  No recent viral infections.

## 2024-02-03 NOTE — ED PROVIDER NOTE - NSFOLLOWUPINSTRUCTIONS_ED_ALL_ED_FT
Please follow up with primary care doctor within 1 week. Your results are attached to this document.    Return for worsening symptoms such as those listed below.      SEEK IMMEDIATE MEDICAL CARE IF YOU HAVE ANY OF THE FOLLOWING SYMPTOMS: rash, worsening abdominal pain, uncontrollable vomiting, profuse diarrhea, inability to have bowel movements or pass gas, black or bloody stools, fever accompanying chest pain or back pain, or fainting. These symptoms may represent a serious problem that is an emergency. Do not wait to see if the symptoms will go away. Get medical help right away. Call 911 and do not drive yourself to the hospital.

## 2024-02-03 NOTE — ED PROVIDER NOTE - PATIENT PORTAL LINK FT
You can access the FollowMyHealth Patient Portal offered by [facility] by registering at the following website: [email or web address]. By joining [employer]’s FollowMyHealth portal, you will also be able to view your health information using other applications (apps) compatible with our system.

## 2024-02-03 NOTE — ED PROVIDER NOTE - ATTENDING CONTRIBUTION TO CARE
Attending Statement (SAMANTHA Washington MD):    HPI: 33-year-old male with history of anxiety/depression complaining of right-sided flank and upper abdominal pain that started last night.  Has improved since onset is now has minimal pain however is concerned as he was told his liver function tests were elevated a few weeks ago ().  No nausea or vomiting.  No fever.  No chest pain or shortness of breath.  No lower abdominal pain or testicular pain.  No diarrhea.  No urinary symptoms.  No history of kidney stones.    Review of Systems:  -General: no fever   -ENT: no congestion  -Pulmonary: no cough, no shortness of breath  -Cardiac: no chest pain  -Gastrointestinal: See HPI.  -Genitourinary: no blood or pain with urination  -Musculoskeletal: no back or neck pain  -Skin: no rashes  -Endocrine: No h/o diabetes  -Neurologic: No new weakness or numbness in extremities    All else negative unless otherwise specified elsewhere in this note.    On Physical Exam:  General: well appearing, in NAD, speaking clearly in full sentences and without difficulty; cooperative with exam  HEENT: anicteric sclera, airway patent  Neck: no JVD  Cardiac: regular, s1 s2  Lungs: CTABL  Abdomen: soft nontender/nondistended  Back: No CVA tenderness bilaterally  : no bladder tenderness or distension  Skin: intact, no rashes on torso  Extremities: no peripheral edema, no gross deformities      MDM: 33-year-old male presenting with right-sided flank and upper abdominal pain.  On exam patient is well-appearing in no acute distress with no abdominal or CVA tenderness.  Differential diagnosis includes ureteral stone/renal colic biliary colic less likely cholecystitis.  Low suspicion though for renal stone given presentation and given concern for LFT abnormality as outpatient biliary colic cholecystitis choledocholithiasis are most concerning.  Will obtain screening labs including CBC CMP and lipase.  Obtain ultrasound to evaluate gallbladder for stones or cholecystitis and can also evaluate for any evidence of hydronephrosis which would increase concern for ureteral stone/obstruction and can then obtain CT imaging.  If no acute actionable findings on ultrasound and labs are nonactionable can likely discharge with recommendations for outpatient follow-up.  Patient declined pain medication      Addendum: Patient made hemodynamically stable remained well-appearing feeling well with no abdominal tenderness.  Continue to improve.  Ultrasound shows no hydronephrosis no evidence of cholelithiasis or cholecystitis.  Labs reviewed no leukocytosis no electrolyte abnormalities or renal dysfunction.  LFTs minimally elevated ALT is downtrending would not pursue further as inpatient, and recommending discharge with close interval outpatient follow-up through his PCP.  Patient in agreement with plan.

## 2024-02-03 NOTE — ED ADULT NURSE NOTE - NSFALLUNIVINTERV_ED_ALL_ED
Bed/Stretcher in lowest position, wheels locked, appropriate side rails in place/Call bell, personal items and telephone in reach/Instruct patient to call for assistance before getting out of bed/chair/stretcher/Non-slip footwear applied when patient is off stretcher/[place] to call system/Physically safe environment - no spills, clutter or unnecessary equipment/Purposeful proactive rounding/Room/bathroom lighting operational, light cord in reach

## 2024-02-03 NOTE — ED PROVIDER NOTE - CLINICAL SUMMARY MEDICAL DECISION MAKING FREE TEXT BOX
33-year-old male  past medical history anxiety and depression presenting to the emergency department for 1 day of right flank and right upper quadrant pain radiating to the right groin area (patient decided to come to the emergency department today as he was told his ALT was elevated at the doctor's few weeks ago (147) and is concerned that his pain is related to this), no associated nausea, vomiting, fever, chest pain, SOB. Given hx and physical, ddx includes but is not limited to   Musculoskeletal, muscle strain, lower concern for cholecystitis or gallstones given nature of pain, lower concern for kidney stone, low concern for UTI (no dysuria or hematuria).  Plan for labs, x-ray, UA, ultrasound, likely DC with outpatient follow-up.

## 2024-02-03 NOTE — ED PROVIDER NOTE - PHYSICAL EXAMINATION
GENERAL: Awake. Alert. NAD. Well nourished.  HEENT: NC/AT, Conjunctiva pink, no scleral icterus. Airway patent. Moist mucous membranes.  LUNGS: CTAB. No wheezes or rales noted.  CARDIAC:  RRR.  ABDOMEN: No masses noted. Soft, NT, ND, no rebound, no guarding.  BACK: No midline spinal tenderness, no CVA tenderness  EXT: No edema, no calf tenderness, distal pulses 2+ bilaterally  NEURO: A&Ox3. Moving all extremities. Sensation and strength intact throughout.   SKIN: Warm and dry. No rash.  PSYCH: Normal affect.

## 2024-02-03 NOTE — ED ADULT NURSE NOTE - OBJECTIVE STATEMENT
33y M BIB self from home p/w R sided abdominal discomfort. Pt is axo4, ambulates independently at baseline. past medical history anxiety and depression. Mentions 1 day of right flank and RUQ radiating to the right groin area. Also recent outpatient blood work performed by [facility] which indicated an elevation in ALT, hx of hepatic cx in the family. no associated nausea, vomiting, fever, chest pain, SOB.  Denies dysuria or hematuria. No rash in the area. No prior surgical history. Patient undressed and placed into gown, call bell in hand and side rails up with bed in lowest position for safety. blanket provided. Comfort and safety provided.

## 2024-02-03 NOTE — ED PROVIDER NOTE - PROGRESS NOTE DETAILS
Jessica Dumont DO (PGY3): POCUS without signs of gallstones or cholecystitis. No hydro noted at bl kidneys. CXR showing clear lungs. AST/ALT with mild elevations 48 and 85 respectively. ALT downtrending from prior results a few weeks ago. Pending UA. Will likely dc with outpt fu. Jessica Dumont DO (PGY3): UA neg for infection. Pt made aware of lab and imaging results, including incidental findings. Questions regarding their symptoms were addressed. Advised to follow up with pcp and psychiatrist regarding medications . Given strict return precautions. Pt verbalized understanding.

## 2024-02-04 LAB
CULTURE RESULTS: SIGNIFICANT CHANGE UP
HAV IGM SER-ACNC: SIGNIFICANT CHANGE UP
HBV CORE IGM SER-ACNC: SIGNIFICANT CHANGE UP
HBV SURFACE AG SER-ACNC: SIGNIFICANT CHANGE UP
HCV AB S/CO SERPL IA: 0.14 S/CO — SIGNIFICANT CHANGE UP (ref 0–0.99)
HCV AB SERPL-IMP: SIGNIFICANT CHANGE UP
SPECIMEN SOURCE: SIGNIFICANT CHANGE UP

## 2024-02-14 ENCOUNTER — RX RENEWAL (OUTPATIENT)
Age: 34
End: 2024-02-14

## 2024-02-14 RX ORDER — NAPROXEN 500 MG/1
500 TABLET ORAL
Qty: 60 | Refills: 0 | Status: ACTIVE | COMMUNITY
Start: 2023-07-19 | End: 1900-01-01

## 2024-03-22 ENCOUNTER — NON-APPOINTMENT (OUTPATIENT)
Age: 34
End: 2024-03-22

## 2024-03-22 ENCOUNTER — APPOINTMENT (OUTPATIENT)
Dept: ENDOCRINOLOGY | Facility: CLINIC | Age: 34
End: 2024-03-22
Payer: COMMERCIAL

## 2024-03-22 VITALS
SYSTOLIC BLOOD PRESSURE: 142 MMHG | WEIGHT: 200 LBS | TEMPERATURE: 98.3 F | HEIGHT: 68 IN | BODY MASS INDEX: 30.31 KG/M2 | HEART RATE: 100 BPM | DIASTOLIC BLOOD PRESSURE: 84 MMHG | OXYGEN SATURATION: 98 %

## 2024-03-22 DIAGNOSIS — R53.83 OTHER FATIGUE: ICD-10-CM

## 2024-03-22 DIAGNOSIS — E66.3 OVERWEIGHT: ICD-10-CM

## 2024-03-22 DIAGNOSIS — R79.89 OTHER SPECIFIED ABNORMAL FINDINGS OF BLOOD CHEMISTRY: ICD-10-CM

## 2024-03-22 DIAGNOSIS — R73.03 PREDIABETES.: ICD-10-CM

## 2024-03-22 PROCEDURE — 99204 OFFICE O/P NEW MOD 45 MIN: CPT

## 2024-03-22 NOTE — HISTORY OF PRESENT ILLNESS
[FreeTextEntry1] : 33 year M referred for management of  elevated TSH, and to be evaluated for metabolic causes leading to reported "brain fog"   Patient with past medical hx as below, remarkable for ADHD, anxiety and depression  Related Thyroid History:   Prior or current medication thyroid use: No Known family or personal hx of thyroid disease: No History of hemithyroidectomy/ thyroidectomy: No Goiter or hx of goiter : No Known Hx of autoimmune disease: No History of Radioactive iodine therapy/ Chest or Neck radiation therapy: No  Reported Symptoms:   Fatigue: No Weight gain without significant change in appetite: No Cold intolerance: No Depression or memory impairment: on medication Weakness, muscle cramps: No Constipation: No Hypersomnolence: No  Hoarseness: No Dyspnea: No Dysphagia: No
How Severe Is This Condition?: mild

## 2024-03-25 LAB
T4 FREE SERPL-MCNC: 1.5 NG/DL
TSH SERPL-ACNC: 1.96 UIU/ML

## 2024-06-10 ENCOUNTER — APPOINTMENT (OUTPATIENT)
Dept: INTERNAL MEDICINE | Facility: CLINIC | Age: 34
End: 2024-06-10

## 2024-10-12 NOTE — ED ADULT TRIAGE NOTE - BP NONINVASIVE SYSTOLIC (MM HG)
Discharge instructions, follow-up and educational materials given at the time of discharge.  All questions were given.   129

## 2025-02-15 ENCOUNTER — EMERGENCY (EMERGENCY)
Facility: HOSPITAL | Age: 35
LOS: 1 days | Discharge: HOME | End: 2025-02-15
Attending: EMERGENCY MEDICINE
Payer: COMMERCIAL

## 2025-02-15 VITALS
DIASTOLIC BLOOD PRESSURE: 89 MMHG | RESPIRATION RATE: 18 BRPM | HEIGHT: 68 IN | HEART RATE: 82 BPM | SYSTOLIC BLOOD PRESSURE: 149 MMHG | OXYGEN SATURATION: 98 % | TEMPERATURE: 98 F | WEIGHT: 210.1 LBS

## 2025-02-15 VITALS
HEART RATE: 67 BPM | TEMPERATURE: 98 F | DIASTOLIC BLOOD PRESSURE: 76 MMHG | RESPIRATION RATE: 18 BRPM | OXYGEN SATURATION: 98 % | SYSTOLIC BLOOD PRESSURE: 126 MMHG

## 2025-02-15 DIAGNOSIS — J34.2 DEVIATED NASAL SEPTUM: Chronic | ICD-10-CM

## 2025-02-15 LAB
ADD ON TEST-SPECIMEN IN LAB: SIGNIFICANT CHANGE UP
ALBUMIN SERPL ELPH-MCNC: 4.4 G/DL — SIGNIFICANT CHANGE UP (ref 3.3–5)
ALP SERPL-CCNC: 56 U/L — SIGNIFICANT CHANGE UP (ref 40–120)
ALT FLD-CCNC: 35 U/L — SIGNIFICANT CHANGE UP (ref 10–45)
ANION GAP SERPL CALC-SCNC: 10 MMOL/L — SIGNIFICANT CHANGE UP (ref 5–17)
APPEARANCE UR: CLEAR — SIGNIFICANT CHANGE UP
AST SERPL-CCNC: 33 U/L — SIGNIFICANT CHANGE UP (ref 10–40)
BACTERIA # UR AUTO: NEGATIVE /HPF — SIGNIFICANT CHANGE UP
BASOPHILS # BLD AUTO: 0.03 K/UL — SIGNIFICANT CHANGE UP (ref 0–0.2)
BASOPHILS NFR BLD AUTO: 0.4 % — SIGNIFICANT CHANGE UP (ref 0–2)
BILIRUB SERPL-MCNC: 0.5 MG/DL — SIGNIFICANT CHANGE UP (ref 0.2–1.2)
BILIRUB UR-MCNC: NEGATIVE — SIGNIFICANT CHANGE UP
BUN SERPL-MCNC: 16 MG/DL — SIGNIFICANT CHANGE UP (ref 7–23)
CALCIUM SERPL-MCNC: 9.4 MG/DL — SIGNIFICANT CHANGE UP (ref 8.4–10.5)
CAST: 0 /LPF — SIGNIFICANT CHANGE UP (ref 0–4)
CHLORIDE SERPL-SCNC: 100 MMOL/L — SIGNIFICANT CHANGE UP (ref 96–108)
CK SERPL-CCNC: 404 U/L — HIGH (ref 30–200)
CO2 SERPL-SCNC: 22 MMOL/L — SIGNIFICANT CHANGE UP (ref 22–31)
COLOR SPEC: YELLOW — SIGNIFICANT CHANGE UP
CREAT SERPL-MCNC: 1.33 MG/DL — HIGH (ref 0.5–1.3)
DIFF PNL FLD: NEGATIVE — SIGNIFICANT CHANGE UP
EGFR: 73 ML/MIN/1.73M2 — SIGNIFICANT CHANGE UP
EOSINOPHIL # BLD AUTO: 0.12 K/UL — SIGNIFICANT CHANGE UP (ref 0–0.5)
EOSINOPHIL NFR BLD AUTO: 1.4 % — SIGNIFICANT CHANGE UP (ref 0–6)
FLUAV AG NPH QL: SIGNIFICANT CHANGE UP
FLUBV AG NPH QL: SIGNIFICANT CHANGE UP
GLUCOSE SERPL-MCNC: 84 MG/DL — SIGNIFICANT CHANGE UP (ref 70–99)
GLUCOSE UR QL: NEGATIVE MG/DL — SIGNIFICANT CHANGE UP
HCT VFR BLD CALC: 45.9 % — SIGNIFICANT CHANGE UP (ref 39–50)
HGB BLD-MCNC: 15.7 G/DL — SIGNIFICANT CHANGE UP (ref 13–17)
HIV 1 & 2 AB SERPL IA.RAPID: SIGNIFICANT CHANGE UP
IMM GRANULOCYTES NFR BLD AUTO: 0.2 % — SIGNIFICANT CHANGE UP (ref 0–0.9)
KETONES UR-MCNC: 15 MG/DL
LEUKOCYTE ESTERASE UR-ACNC: NEGATIVE — SIGNIFICANT CHANGE UP
LYMPHOCYTES # BLD AUTO: 2.29 K/UL — SIGNIFICANT CHANGE UP (ref 1–3.3)
LYMPHOCYTES # BLD AUTO: 26.8 % — SIGNIFICANT CHANGE UP (ref 13–44)
MAGNESIUM SERPL-MCNC: 2 MG/DL — SIGNIFICANT CHANGE UP (ref 1.6–2.6)
MCHC RBC-ENTMCNC: 29.9 PG — SIGNIFICANT CHANGE UP (ref 27–34)
MCHC RBC-ENTMCNC: 34.2 G/DL — SIGNIFICANT CHANGE UP (ref 32–36)
MCV RBC AUTO: 87.4 FL — SIGNIFICANT CHANGE UP (ref 80–100)
MONOCYTES # BLD AUTO: 0.59 K/UL — SIGNIFICANT CHANGE UP (ref 0–0.9)
MONOCYTES NFR BLD AUTO: 6.9 % — SIGNIFICANT CHANGE UP (ref 2–14)
NEUTROPHILS # BLD AUTO: 5.49 K/UL — SIGNIFICANT CHANGE UP (ref 1.8–7.4)
NEUTROPHILS NFR BLD AUTO: 64.3 % — SIGNIFICANT CHANGE UP (ref 43–77)
NITRITE UR-MCNC: NEGATIVE — SIGNIFICANT CHANGE UP
NRBC BLD AUTO-RTO: 0 /100 WBCS — SIGNIFICANT CHANGE UP (ref 0–0)
PH UR: 6 — SIGNIFICANT CHANGE UP (ref 5–8)
PHOSPHATE SERPL-MCNC: 2.8 MG/DL — SIGNIFICANT CHANGE UP (ref 2.5–4.5)
PLATELET # BLD AUTO: 237 K/UL — SIGNIFICANT CHANGE UP (ref 150–400)
POTASSIUM SERPL-MCNC: 4 MMOL/L — SIGNIFICANT CHANGE UP (ref 3.5–5.3)
POTASSIUM SERPL-SCNC: 4 MMOL/L — SIGNIFICANT CHANGE UP (ref 3.5–5.3)
PROT SERPL-MCNC: 7.2 G/DL — SIGNIFICANT CHANGE UP (ref 6–8.3)
PROT UR-MCNC: NEGATIVE MG/DL — SIGNIFICANT CHANGE UP
RBC # BLD: 5.25 M/UL — SIGNIFICANT CHANGE UP (ref 4.2–5.8)
RBC # FLD: 12 % — SIGNIFICANT CHANGE UP (ref 10.3–14.5)
RBC CASTS # UR COMP ASSIST: 0 /HPF — SIGNIFICANT CHANGE UP (ref 0–4)
RSV RNA NPH QL NAA+NON-PROBE: SIGNIFICANT CHANGE UP
SARS-COV-2 RNA SPEC QL NAA+PROBE: SIGNIFICANT CHANGE UP
SODIUM SERPL-SCNC: 132 MMOL/L — LOW (ref 135–145)
SP GR SPEC: 1.02 — SIGNIFICANT CHANGE UP (ref 1–1.03)
SQUAMOUS # UR AUTO: 0 /HPF — SIGNIFICANT CHANGE UP (ref 0–5)
UROBILINOGEN FLD QL: 1 MG/DL — SIGNIFICANT CHANGE UP (ref 0.2–1)
WBC # BLD: 8.54 K/UL — SIGNIFICANT CHANGE UP (ref 3.8–10.5)
WBC # FLD AUTO: 8.54 K/UL — SIGNIFICANT CHANGE UP (ref 3.8–10.5)
WBC UR QL: 0 /HPF — SIGNIFICANT CHANGE UP (ref 0–5)

## 2025-02-15 PROCEDURE — 87591 N.GONORRHOEAE DNA AMP PROB: CPT

## 2025-02-15 PROCEDURE — 81001 URINALYSIS AUTO W/SCOPE: CPT

## 2025-02-15 PROCEDURE — 87086 URINE CULTURE/COLONY COUNT: CPT

## 2025-02-15 PROCEDURE — 84100 ASSAY OF PHOSPHORUS: CPT

## 2025-02-15 PROCEDURE — 99283 EMERGENCY DEPT VISIT LOW MDM: CPT | Mod: 25

## 2025-02-15 PROCEDURE — 36415 COLL VENOUS BLD VENIPUNCTURE: CPT

## 2025-02-15 PROCEDURE — 80053 COMPREHEN METABOLIC PANEL: CPT

## 2025-02-15 PROCEDURE — 83735 ASSAY OF MAGNESIUM: CPT

## 2025-02-15 PROCEDURE — 86703 HIV-1/HIV-2 1 RESULT ANTBDY: CPT

## 2025-02-15 PROCEDURE — 87637 SARSCOV2&INF A&B&RSV AMP PRB: CPT

## 2025-02-15 PROCEDURE — 99285 EMERGENCY DEPT VISIT HI MDM: CPT

## 2025-02-15 PROCEDURE — 86780 TREPONEMA PALLIDUM: CPT

## 2025-02-15 PROCEDURE — 82550 ASSAY OF CK (CPK): CPT

## 2025-02-15 PROCEDURE — 93005 ELECTROCARDIOGRAM TRACING: CPT

## 2025-02-15 PROCEDURE — 87491 CHLMYD TRACH DNA AMP PROBE: CPT

## 2025-02-15 PROCEDURE — 85025 COMPLETE CBC W/AUTO DIFF WBC: CPT

## 2025-02-15 PROCEDURE — 84443 ASSAY THYROID STIM HORMONE: CPT

## 2025-02-15 RX ORDER — BACTERIOSTATIC SODIUM CHLORIDE 0.9 %
1000 VIAL (ML) INJECTION ONCE
Refills: 0 | Status: COMPLETED | OUTPATIENT
Start: 2025-02-15 | End: 2025-02-15

## 2025-02-15 RX ADMIN — Medication 1000 MILLILITER(S): at 17:30

## 2025-02-15 NOTE — ED ADULT TRIAGE NOTE - CHIEF COMPLAINT QUOTE
pt c/o lethargy, lightheadedness and "brain fog" since yesterday  pt denies fevers  pt a&ox3, speaking in full sentences, pizano and following commands in triage

## 2025-02-15 NOTE — ED PROVIDER NOTE - PATIENT PORTAL LINK FT
Other Specify
You can access the FollowMyHealth Patient Portal offered by [facility] by registering at the following website: [email or web address]. By joining [employer]’s FollowMyHealth portal, you will also be able to view your health information using other applications (apps) compatible with our system.

## 2025-02-15 NOTE — ED PROVIDER NOTE - OBJECTIVE STATEMENT
33 y/o male with pmhx of depression, anxiety, insomnia, headaches, presents to the ed with multiple medical complaints. States that today felt extreme fatigue and general malaise. [place] that he was unable to keep his eyes open today. Also reports muscle twitching to b/l upper extremities as well as intermittent parasthesias. He states that these symptoms have been going on for about 36 hours. States that he has been having difficulty with sleep lately. Also reports frequent headaches. States that past few weeks has increased the amount of exercise he has been doing. States that he exercises 6 days per week. Also has increased his creatine powder from 5 mg daily to 10 mg daily. Denies any headache, fever, chills, chest pain, difficulty breathing, n/v/d.

## 2025-02-15 NOTE — ED PROVIDER NOTE - PROGRESS NOTE DETAILS
CK and creatine mildly elevated. pt and mom at bedside informed of findings. I recommend pt limit creatine intake as well as excessive exercise. Informed pt to rest and stay hydrated. Recc f/u with pcp in few days for reevaluation. Verbalized understanding. Stable for dc. Araceli Fuchs PA-C

## 2025-02-15 NOTE — ED PROVIDER NOTE - PHYSICAL EXAMINATION
CONSTITUTIONAL: Patient is awake, alert and oriented x 3. Patient is well appearing and in no acute distress.  HEAD: NCAT  EYES: PERRL bilaterally,  ENT: Airway patent, Nasal mucosa clear.  NECK: Supple,   LUNGS: CTA B/L,   HEART: RRR.+S1S2   ABDOMEN: Soft, non-tender to palpation throughout all four quadrants, non-distended,  no rebound tenderness to palpation, no guarding,    MSK: FROM upper and lower ext b/l,   SKIN: No rash or lesions  NEURO: No focal deficits,

## 2025-02-15 NOTE — ED PROVIDER NOTE - CARE PLAN
Principal Discharge DX:	Fatigue   1 Principal Discharge DX:	Mild dehydration  Secondary Diagnosis:	Migraine headache  Secondary Diagnosis:	Fatigue

## 2025-02-15 NOTE — ED ADULT NURSE NOTE - OBJECTIVE STATEMENT
33 y/o M with PMH of migraine headaches, depression, anxiety, insomnia, ADHD presents to ED complaining of lethargy. Pt reports he has had generalized fatigue associated with generalized weakness for the past 3 days. Pt reports he did have a headache this morning associated with a "heaviness in eyes and brain fog". Upon arrival, pt is A&OX4, satting well on RA. Afebrile orally. Well appearing. Abdomen is soft and non tender. Full strength and sensation in all extremities. Denies dizziness, vision changes, chest pain, shortness of breath, abdominal pain, nausea, vomiting, diarrhea, fevers, chills, dysuria, hematuria, recent illness travel or fall.

## 2025-02-15 NOTE — ED PROVIDER NOTE - NSFOLLOWUPINSTRUCTIONS_ED_ALL_ED_FT
1. Follow up with  your PCP within 2-3 days.   2. Rest. Hydrate.   3. Take Ibuprofen (i.e. Motrin, Advil) 600mg every 8 hrs for pain as needed. Take with food.   May alternate with Acetaminophen (Tylenol) 650mg every 6 hours for pain as needed.  4. Avoid excessive exercise. Avoid taking excessive creatine powder.   5. Drink plenty of fluids.   6. Return to the emergency department if you have worsening pain, difficulty breathing, fainting, dizziness or all other concerns.

## 2025-02-15 NOTE — ED PROVIDER NOTE - ATTENDING APP SHARED VISIT CONTRIBUTION OF CARE
34-year-old male history of migraine headaches, depression, anxiety, insomnia, ADHD presenting for evaluation of increased generalized weakness and fatigue for the past 3 days worse today.  Denies any associated fever or chills.  Denies any sinus pressure or heaviness.  Does report heavy feeling in his eyes.  Did have a headache this morning for which he took Nurtec.  Denies any neck pain or stiffness.  No chest pain palpitations or syncopal symptoms.  Does have some mild upper abdominal discomfort without any associated nausea emesis or diarrhea. No SI/HI, mood changes. Has been taking creatine supplement at 10mg dose for past 2 weeks and increased his work out frequency. VS non actionable. PE unremarkable, PERRL EOMI neck supple nml heart and lung sounds abd soft ntnd ext wwp A&Ox3, not lethargic CN intact nop fnd no sinus TTP. Ddx includes but is not limited to: electrolyte abn, dehydration, viral syndrome, rhabdo, headache syndrome. Labs, screening EKG, IV hydration. Marquis marti OP [place].

## 2025-02-16 LAB
CULTURE RESULTS: NO GROWTH — SIGNIFICANT CHANGE UP
SPECIMEN SOURCE: SIGNIFICANT CHANGE UP
T PALLIDUM AB TITR SER: NEGATIVE — SIGNIFICANT CHANGE UP

## 2025-02-18 LAB
C TRACH RRNA SPEC QL NAA+PROBE: SIGNIFICANT CHANGE UP
N GONORRHOEA RRNA SPEC QL NAA+PROBE: SIGNIFICANT CHANGE UP

## 2025-08-12 ENCOUNTER — APPOINTMENT (OUTPATIENT)
Dept: NEUROLOGY | Facility: CLINIC | Age: 35
End: 2025-08-12
Payer: COMMERCIAL

## 2025-08-12 VITALS
SYSTOLIC BLOOD PRESSURE: 135 MMHG | HEART RATE: 97 BPM | WEIGHT: 205 LBS | BODY MASS INDEX: 31.07 KG/M2 | DIASTOLIC BLOOD PRESSURE: 80 MMHG | TEMPERATURE: 98.2 F | HEIGHT: 68 IN

## 2025-08-12 DIAGNOSIS — G47.00 INSOMNIA, UNSPECIFIED: ICD-10-CM

## 2025-08-12 DIAGNOSIS — R06.83 SNORING: ICD-10-CM

## 2025-08-12 DIAGNOSIS — G43.909 MIGRAINE, UNSPECIFIED, NOT INTRACTABLE, W/OUT STATUS MIGRAINOSUS: ICD-10-CM

## 2025-08-12 DIAGNOSIS — G47.09 OTHER INSOMNIA: ICD-10-CM

## 2025-08-12 PROCEDURE — 99204 OFFICE O/P NEW MOD 45 MIN: CPT

## 2025-08-12 RX ORDER — PROPRANOLOL HYDROCHLORIDE 20 MG/1
20 TABLET ORAL
Refills: 0 | Status: ACTIVE | COMMUNITY

## 2025-08-12 RX ORDER — ATOGEPANT 60 MG/1
60 TABLET ORAL
Qty: 30 | Refills: 5 | Status: ACTIVE | COMMUNITY
Start: 2025-08-12 | End: 1900-01-01

## 2025-08-12 RX ORDER — SUVOREXANT 10 MG/1
10 TABLET, FILM COATED ORAL
Qty: 30 | Refills: 3 | Status: ACTIVE | COMMUNITY
Start: 2025-08-12 | End: 1900-01-01